# Patient Record
Sex: FEMALE | Race: WHITE | NOT HISPANIC OR LATINO | URBAN - METROPOLITAN AREA
[De-identification: names, ages, dates, MRNs, and addresses within clinical notes are randomized per-mention and may not be internally consistent; named-entity substitution may affect disease eponyms.]

---

## 2020-09-24 ENCOUNTER — HOSPITAL ENCOUNTER (OUTPATIENT)
Facility: CLINIC | Age: 65
Discharge: HOME | End: 2020-09-24
Attending: OBSTETRICS & GYNECOLOGY

## 2020-09-24 VITALS — TEMPERATURE: 98.8 F

## 2020-09-24 PROCEDURE — 90471 IMMUNIZATION ADMIN: CPT | Performed by: EMERGENCY MEDICINE

## 2020-09-24 PROCEDURE — 90686 IIV4 VACC NO PRSV 0.5 ML IM: CPT | Performed by: EMERGENCY MEDICINE

## 2021-04-15 DIAGNOSIS — Z23 ENCOUNTER FOR IMMUNIZATION: ICD-10-CM

## 2021-07-12 ENCOUNTER — HOSPITAL ENCOUNTER (EMERGENCY)
Facility: HOSPITAL | Age: 66
Discharge: LEFT BEFORE DISCHARGE | End: 2021-07-13
Payer: COMMERCIAL

## 2021-07-12 VITALS
RESPIRATION RATE: 16 BRPM | HEIGHT: 66 IN | BODY MASS INDEX: 22.5 KG/M2 | HEART RATE: 77 BPM | DIASTOLIC BLOOD PRESSURE: 82 MMHG | TEMPERATURE: 98 F | WEIGHT: 140 LBS | OXYGEN SATURATION: 96 % | SYSTOLIC BLOOD PRESSURE: 149 MMHG

## 2021-07-12 LAB
ANION GAP SERPL CALC-SCNC: 12 MEQ/L (ref 3–15)
BASOPHILS # BLD: 0.04 K/UL (ref 0.01–0.1)
BASOPHILS NFR BLD: 0.5 %
BUN SERPL-MCNC: 18 MG/DL (ref 8–20)
CALCIUM SERPL-MCNC: 9.5 MG/DL (ref 8.9–10.3)
CHLORIDE SERPL-SCNC: 100 MEQ/L (ref 98–109)
CO2 SERPL-SCNC: 26 MEQ/L (ref 22–32)
CREAT SERPL-MCNC: 0.8 MG/DL (ref 0.6–1.1)
DIFFERENTIAL METHOD BLD: NORMAL
EOSINOPHIL # BLD: 0.21 K/UL (ref 0.04–0.36)
EOSINOPHIL NFR BLD: 2.8 %
ERYTHROCYTE [DISTWIDTH] IN BLOOD BY AUTOMATED COUNT: 12.3 % (ref 11.7–14.4)
GFR SERPL CREATININE-BSD FRML MDRD: >60 ML/MIN/1.73M*2
GLUCOSE SERPL-MCNC: 107 MG/DL (ref 70–99)
HCT VFR BLDCO AUTO: 40.7 % (ref 35–45)
HGB BLD-MCNC: 13.4 G/DL (ref 11.8–15.7)
IMM GRANULOCYTES # BLD AUTO: 0.01 K/UL (ref 0–0.08)
IMM GRANULOCYTES NFR BLD AUTO: 0.1 %
LYMPHOCYTES # BLD: 2.21 K/UL (ref 1.2–3.5)
LYMPHOCYTES NFR BLD: 29.1 %
MCH RBC QN AUTO: 31.4 PG (ref 28–33.2)
MCHC RBC AUTO-ENTMCNC: 32.9 G/DL (ref 32.2–35.5)
MCV RBC AUTO: 95.3 FL (ref 83–98)
MONOCYTES # BLD: 0.5 K/UL (ref 0.28–0.8)
MONOCYTES NFR BLD: 6.6 %
NEUTROPHILS # BLD: 4.63 K/UL (ref 1.7–7)
NEUTS SEG NFR BLD: 60.9 %
NRBC BLD-RTO: 0 %
PDW BLD AUTO: 9.8 FL (ref 9.4–12.3)
PLATELET # BLD AUTO: 236 K/UL (ref 150–369)
POTASSIUM SERPL-SCNC: 4.3 MEQ/L (ref 3.6–5.1)
RBC # BLD AUTO: 4.27 M/UL (ref 3.93–5.22)
SODIUM SERPL-SCNC: 138 MEQ/L (ref 136–144)
WBC # BLD AUTO: 7.6 K/UL (ref 3.8–10.5)

## 2021-07-12 PROCEDURE — 85025 COMPLETE CBC W/AUTO DIFF WBC: CPT

## 2021-07-12 PROCEDURE — 87040 BLOOD CULTURE FOR BACTERIA: CPT

## 2021-07-12 PROCEDURE — 36415 COLL VENOUS BLD VENIPUNCTURE: CPT

## 2021-07-12 PROCEDURE — 80048 BASIC METABOLIC PNL TOTAL CA: CPT

## 2021-07-12 RX ORDER — SULFAMETHOXAZOLE AND TRIMETHOPRIM 200; 40 MG/5ML; MG/5ML
SUSPENSION ORAL 2 TIMES DAILY
COMMUNITY
End: 2023-09-08 | Stop reason: ALTCHOICE

## 2021-07-18 LAB — BACTERIA BLD CULT: NORMAL

## 2023-04-08 ENCOUNTER — APPOINTMENT (OUTPATIENT)
Dept: RADIOLOGY | Age: 68
End: 2023-04-08
Payer: MEDICARE

## 2023-04-08 ENCOUNTER — HOSPITAL ENCOUNTER (OUTPATIENT)
Facility: CLINIC | Age: 68
Discharge: HOME | End: 2023-04-08
Attending: FAMILY MEDICINE
Payer: MEDICARE

## 2023-04-08 VITALS
TEMPERATURE: 97.8 F | OXYGEN SATURATION: 96 % | WEIGHT: 140 LBS | SYSTOLIC BLOOD PRESSURE: 138 MMHG | HEIGHT: 66 IN | DIASTOLIC BLOOD PRESSURE: 78 MMHG | BODY MASS INDEX: 22.5 KG/M2 | HEART RATE: 95 BPM

## 2023-04-08 DIAGNOSIS — M25.552 PAIN OF LEFT HIP: Primary | ICD-10-CM

## 2023-04-08 PROCEDURE — 99213 OFFICE O/P EST LOW 20 MIN: CPT

## 2023-04-08 PROCEDURE — 73502 X-RAY EXAM HIP UNI 2-3 VIEWS: CPT

## 2023-04-08 RX ORDER — CEFDINIR 300 MG/1
300 CAPSULE ORAL 2 TIMES DAILY
Qty: 14 CAPSULE | Refills: 0 | Status: SHIPPED | OUTPATIENT
Start: 2023-04-08 | End: 2023-04-15

## 2023-04-08 ASSESSMENT — ENCOUNTER SYMPTOMS
DYSURIA: 0
SINUS PAIN: 0
SINUS PRESSURE: 0
SORE THROAT: 0
BACK PAIN: 0
HEADACHES: 0
TROUBLE SWALLOWING: 0
NUMBNESS: 0
DIZZINESS: 1
FATIGUE: 0
FREQUENCY: 0
LIGHT-HEADEDNESS: 0
COLOR CHANGE: 0
NAUSEA: 0
MYALGIAS: 1
FEVER: 0
WHEEZING: 0
VOMITING: 0
WEAKNESS: 0
SHORTNESS OF BREATH: 0
HEMATURIA: 0
EYES NEGATIVE: 1
PALPITATIONS: 0
ABDOMINAL PAIN: 0
NECK STIFFNESS: 0
CHILLS: 0
FLANK PAIN: 0
COUGH: 0
ACTIVITY CHANGE: 1
RHINORRHEA: 0
NECK PAIN: 0
DIARRHEA: 0
CHEST TIGHTNESS: 0
ARTHRALGIAS: 1

## 2023-04-08 NOTE — ED PROVIDER NOTES
Emergency Medicine Note  HPI   HISTORY OF PRESENT ILLNESS     Pt reporting intense constant pain in left hip, tender to palpation, having trouble walking and using stairs which started around Wednesday.  Pain woke her up out of her sleep, she denies any trauma/injury to the area.   She has been taking Advil at night to get sleep since it has been keeping her up at night.   Had similar pain in right hip 6 years ago with associated fever that developed 5-6 days later and put on Cefdinir which made her feel better by the 2nd dose.   Pt requesting Cefdinir rx for this visit.  Pt denies any fever/chills, SOB, CP, ear pain, sinus congestion, abdominal pain, lightheadedness, n/v/d, cough, back pain, flank pain, dysuria.                  Patient History   PAST HISTORY     Reviewed from Nursing Triage:  Tobacco  Allergies  Meds  Problems  Med Hx  Surg Hx  Fam Hx  Soc   Hx      History reviewed. No pertinent past medical history.    History reviewed. No pertinent surgical history.    Family History   Problem Relation Age of Onset   • No Known Problems Biological Mother    • No Known Problems Biological Father        Social History     Tobacco Use   • Smoking status: Never   • Smokeless tobacco: Never   Substance Use Topics   • Alcohol use: Yes   • Drug use: Never         Review of Systems   REVIEW OF SYSTEMS     Review of Systems   Constitutional: Positive for activity change. Negative for chills, fatigue and fever.   HENT: Negative for congestion, ear pain, postnasal drip, rhinorrhea, sinus pressure, sinus pain, sore throat and trouble swallowing.    Eyes: Negative.    Respiratory: Negative for cough, chest tightness, shortness of breath and wheezing.    Cardiovascular: Negative for chest pain and palpitations.   Gastrointestinal: Negative for abdominal pain, diarrhea, nausea and vomiting.   Genitourinary: Negative for decreased urine volume, dysuria, flank pain, frequency, hematuria, pelvic pain and urgency.    Musculoskeletal: Positive for arthralgias, gait problem and myalgias. Negative for back pain, neck pain and neck stiffness.   Skin: Negative for color change and rash.   Neurological: Positive for dizziness. Negative for weakness, light-headedness, numbness and headaches.   All other systems reviewed and are negative.        VITALS     ED Vitals    Date/Time Temp Pulse Resp BP SpO2 [facility]   04/08/23 1229 36.6 °C (97.8 °F) 95 -- 138/78 96 % JQ                       Physical Exam   PHYSICAL EXAM     Physical Exam  Vitals and nursing note reviewed.   Constitutional:       General: She is not in acute distress.     Appearance: She is well-developed. She is not ill-appearing, toxic-appearing or diaphoretic.   HENT:      Head: Normocephalic and atraumatic.      Mouth/Throat:      Mouth: Mucous membranes are moist.   Eyes:      Extraocular Movements: Extraocular movements intact.      Conjunctiva/sclera: Conjunctivae normal.      Pupils: Pupils are equal, round, and reactive to light.   Cardiovascular:      Rate and Rhythm: Normal rate.      Pulses: Normal pulses.   Pulmonary:      Effort: Pulmonary effort is normal. No respiratory distress.   Abdominal:      Tenderness: There is no right CVA tenderness or left CVA tenderness.   Musculoskeletal:         General: Tenderness present. No swelling, deformity or signs of injury.      Cervical back: Normal range of motion.      Right lower leg: Normal. No edema.      Left lower leg: Tenderness and bony tenderness present. No edema.        Legs:       Comments: +ttp, limited ROM and with pain, 4/5 strength, unsteady gait, no surface trauma or ecchymosis, swelling or erythema.     Skin:     General: Skin is warm and dry.      Capillary Refill: Capillary refill takes less than 2 seconds.      Findings: No bruising or erythema.   Neurological:      Mental Status: She is alert and oriented to person, place, and time.           PROCEDURES     Procedures     DATA     Results     None               No orders to display       Scoring tools                                  ED Course & MDM   MDM / ED COURSE / CLINICAL IMPRESSION / DISPO     Medical Decision Making  Xray hip normal  Discussed options of steroid vs Cefdinir requested by pt vs OTC meds  Pt requesting to be given rx for Cefdinir- given exact same symptoms as last time, rx sent to pharmacy   Recommending RICE & NSAIDs  Strict return precautions given.  Pt understands that if symptoms don't resolve within 24 hours of starting abx, alternative treatment may be necessary.  Recommending Orthopedic specialist if symptoms persist/worsen.   Follow up with PCP as needed    Pt given the opportunity to ask questions, we discussed SE profile of meds and pt agreeable to plan.           Clinical Impression      Pain of left hip               Gavi Shaw, LIN  04/08/23 1365

## 2023-04-08 NOTE — DISCHARGE INSTRUCTIONS
Your xray was normal.  CLINICAL HISTORY: Left hip pain, constant.  M 25.552     COMMENT: AP radiograph of the pelvis and AP and lateral views of the left hip  are obtained.     COMPARISON: None.     There is normal osseous mineralization.  No evidence for focal bone lesion.  No fracture or dislocation.  Alternative changes are seen at both hips with osteophytes at the femoral head  neck junctions bilaterally. Degenerative changes at the pubic symphysis.     --  IMPRESSION:  No fracture or dislocation of the left hip.    Due to your symptoms and prior history, you have requested a prescription for Cefdinir which you received when you had this exact same pain in your right hip 6 years ago.  If you do not feel any improvement within 24 hours of starting the antibiotic, give the office a call as you may need alternative therapy.    Ice the injured area to help reduce pain and swelling. Wrap a cold source (recommending a bag of frozen peas or frozen corn) in a thin towel. Apply to the injured area for 20 minutes every 1 to 2 hours the first day. Continue this 3 to 4 times a day until the pain and swelling goes away.     Elevate injured body part(s) above the level of the heart.     Remember,  Ice is Nice, that's why they rhyme, and you can use it ALL the time : )     __________________________________________________________    **Avoid heat if possible. If anything, apply moist heat (ie- shower, warm wet washcloth compresses), this is preferred over dry heat (ie-heating pad).** Moist heat is recommended prior to activity/strengthening/stretching. Ice immediately after .  __________________________________________________________    For pain:    You may alternate Ibuprofen with Tylenol every 3-4 hours. (Ex: Ibuprofen at 8am, Tylenol at 11am, Ibuprofen at 2pm, etc) as needed.   Ibuprofen 600mg every 6hr, Always with food   (Max daily dose not to exceed 2400mg!) Tylenol 500mg doses every 4hrs OR 650mg doses every 6hrs (Max  daily dose not to exceed 3000mg!)    **Do NOT take Ibuprofen if you are also using Voltaren**  __________________________________________________________     If symptoms persist or worsen, consider following up with Primary Care Provider and/or an Orthopedic specialist.

## 2023-04-09 ENCOUNTER — TELEPHONE (OUTPATIENT)
Dept: URGENT CARE | Facility: CLINIC | Age: 68
End: 2023-04-09
Payer: MEDICARE

## 2023-04-09 NOTE — ED ATTESTATION NOTE
I was immediately available to provide supervision and direction for the care of the patient.    The patient was evaluated and managed by the nurse practitioner.       Og Carter DO  04/09/23 1202

## 2023-09-08 ENCOUNTER — HOSPITAL ENCOUNTER (OUTPATIENT)
Facility: CLINIC | Age: 68
Discharge: HOME | End: 2023-09-08
Attending: FAMILY MEDICINE
Payer: MEDICARE

## 2023-09-08 VITALS
TEMPERATURE: 98.4 F | RESPIRATION RATE: 16 BRPM | SYSTOLIC BLOOD PRESSURE: 123 MMHG | HEART RATE: 75 BPM | DIASTOLIC BLOOD PRESSURE: 75 MMHG | OXYGEN SATURATION: 97 %

## 2023-09-08 DIAGNOSIS — J06.9 VIRAL UPPER RESPIRATORY TRACT INFECTION: Primary | ICD-10-CM

## 2023-09-08 LAB
FLUAV RNA SPEC QL NAA+PROBE: NEGATIVE
FLUBV RNA SPEC QL NAA+PROBE: NEGATIVE
RSV RNA SPEC QL NAA+PROBE: NEGATIVE
SARS-COV-2 RNA RESP QL NAA+PROBE: NEGATIVE

## 2023-09-08 PROCEDURE — 87637 SARSCOV2&INF A&B&RSV AMP PRB: CPT | Performed by: FAMILY MEDICINE

## 2023-09-08 PROCEDURE — 99213 OFFICE O/P EST LOW 20 MIN: CPT | Performed by: FAMILY MEDICINE

## 2023-09-08 ASSESSMENT — ENCOUNTER SYMPTOMS
WHEEZING: 0
HEADACHES: 1
CHILLS: 0
SHORTNESS OF BREATH: 0
FEVER: 0
SORE THROAT: 0
COUGH: 1

## 2023-09-08 NOTE — ED PROVIDER NOTES
History  Chief Complaint   Patient presents with    URI     Headache congestion earache at home covid negative  patient was with 3 people earlier in the week that were positive     Patient recently returned from the beach and the other guests tested positive for Covid.  Her symptoms started yesterday.  She has mild cold symptoms.  She has mild cough and congestion.  She has no fever or chills.  She has no sore throat.  He home covid test was negative this morning.      URI  Presenting symptoms: congestion and cough    Presenting symptoms: no fever and no sore throat    Severity:  Mild  Duration:  1 day  Progression:  Unchanged  Chronicity:  New  Worsened by:  Nothing  Ineffective treatments:  None tried  Associated symptoms: headaches    Associated symptoms: no wheezing    Risk factors: recent travel and sick contacts        History reviewed. No pertinent past medical history.    History reviewed. No pertinent surgical history.    Family History   Problem Relation Age of Onset    No Known Problems Biological Mother     No Known Problems Biological Father        Social History     Tobacco Use    Smoking status: Never    Smokeless tobacco: Never   Substance Use Topics    Alcohol use: Yes    Drug use: Never       Review of Systems   Constitutional: Negative for chills and fever.   HENT: Positive for congestion. Negative for sore throat.    Respiratory: Positive for cough. Negative for shortness of breath and wheezing.    Neurological: Positive for headaches.       Physical Exam  ED Triage Vitals [09/08/23 1316]   Temp Heart Rate Resp BP SpO2   36.9 °C (98.4 °F) 75 16 123/75 97 %      Temp src Heart Rate Source Patient Position BP Location FiO2 (%) (Set)   -- Monitor Sitting Left upper arm --       Physical Exam  Vitals reviewed.   Constitutional:       Appearance: Normal appearance.   HENT:      Right Ear: Tympanic membrane, ear canal and external ear normal.      Left Ear: Tympanic membrane, ear canal and  external ear normal.      Mouth/Throat:      Mouth: Mucous membranes are moist.      Pharynx: Oropharynx is clear.   Cardiovascular:      Rate and Rhythm: Normal rate and regular rhythm.      Heart sounds: Normal heart sounds.   Pulmonary:      Effort: Pulmonary effort is normal. No respiratory distress.      Breath sounds: Normal breath sounds. No wheezing or rhonchi.   Musculoskeletal:      Cervical back: Neck supple.   Lymphadenopathy:      Cervical: No cervical adenopathy.   Neurological:      Mental Status: She is alert.           Procedures  Procedures    UC Course   Viral URI    MDM  COVID swab done  Symptomatic care  Quarantine until results return  Rest and fluids             Gaby Nails,   09/08/23 1428

## 2024-01-02 ENCOUNTER — HOSPITAL ENCOUNTER (OUTPATIENT)
Facility: CLINIC | Age: 69
Discharge: HOME | End: 2024-01-02
Attending: EMERGENCY MEDICINE
Payer: MEDICARE

## 2024-01-02 VITALS
SYSTOLIC BLOOD PRESSURE: 145 MMHG | HEART RATE: 88 BPM | DIASTOLIC BLOOD PRESSURE: 66 MMHG | OXYGEN SATURATION: 97 % | TEMPERATURE: 98.5 F

## 2024-01-02 DIAGNOSIS — N30.01 ACUTE CYSTITIS WITH HEMATURIA: Primary | ICD-10-CM

## 2024-01-02 LAB
BACTERIA, POC: ABNORMAL
BILIRUBIN, POC: NEGATIVE
BLOOD URINE, POC: POSITIVE
CLARITY, POC: CLEAR
COLOR, POC: COLORLESS
EXPIRATION DATE: ABNORMAL
GLUCOSE URINE, POC: NEGATIVE
KETONES, POC: NEGATIVE
LEUKOCYTE EST, POC: ABNORMAL
Lab: ABNORMAL
NITRITE, POC: NEGATIVE
PH, POC: 6
POCT MANUFACTURER: ABNORMAL
PROTEIN, POC: ABNORMAL
SPECIFIC GRAVITY, POC: 1000
UROBILINOGEN, POC: 0.2

## 2024-01-02 PROCEDURE — 99213 OFFICE O/P EST LOW 20 MIN: CPT

## 2024-01-02 PROCEDURE — 87186 SC STD MICRODIL/AGAR DIL: CPT

## 2024-01-02 PROCEDURE — 81002 URINALYSIS NONAUTO W/O SCOPE: CPT | Performed by: EMERGENCY MEDICINE

## 2024-01-02 RX ORDER — CEFUROXIME AXETIL 250 MG/1
250 TABLET ORAL 2 TIMES DAILY
Qty: 14 TABLET | Refills: 0 | Status: SHIPPED | OUTPATIENT
Start: 2024-01-02 | End: 2024-01-09

## 2024-01-02 ASSESSMENT — ENCOUNTER SYMPTOMS
NECK PAIN: 0
NUMBNESS: 0
CHEST TIGHTNESS: 0
SHORTNESS OF BREATH: 0
ABDOMINAL PAIN: 0
FATIGUE: 0
SINUS PRESSURE: 0
NECK STIFFNESS: 0
EYES NEGATIVE: 1
COLOR CHANGE: 0
FEVER: 0
SINUS PAIN: 0
PALPITATIONS: 0
DIZZINESS: 0
MYALGIAS: 0
HEADACHES: 0
FREQUENCY: 1
DIARRHEA: 0
COUGH: 0
LIGHT-HEADEDNESS: 0
RHINORRHEA: 0
TROUBLE SWALLOWING: 0
DYSURIA: 1
VOMITING: 0
NAUSEA: 0
WHEEZING: 0
BACK PAIN: 0
SORE THROAT: 0
CHILLS: 0
WEAKNESS: 0
HEMATURIA: 1
FLANK PAIN: 0

## 2024-01-02 NOTE — DISCHARGE INSTRUCTIONS
You are being treated for urinary tract infection (UTI) with an antibiotic.  Once started, please complete full course of antibiotic to prevent creating resistance within the bugs causing your illness.  Please eat a daily yogurt or take a daily Probiotic for GI health while taking antibiotics.  Recommending the probiotic Digestive Advantage (safe for patients 3yrs and older).  Remember to take the probiotic *no sooner* than 4 hours *after* taking your antibiotic.  If you take the probiotic sooner than that, the good bacteria will likely be killed by the antibiotic.     Symptomatic relief with prescription Pyridium tablets and/or OTC Azo were discussed.  **It will turn your urine ORANGE** Fear not! This is expected ;)    I recommend drinking plenty of fluids to help flush out the infection.  You may take Tylenol/Ibuprofen as needed for pain.    *If you develop new onset low back pain, abdominal pain, fevers or chills over the next several days, please call your primary care provider to discuss seeking reevaluation or seek immediate medical attention.    If the urine sample was sent for additional testing (culture and sensitivity to antibiotics), you will receive a call only if your antibiotic needs to be changed to a different one.  If you are signed up for My Chart, results may also be viewed in that patient portal.  __________________________________________________________    Please let us know about your experience today!   Your input is truly appreciated and helps KENNEDY Wynn and your team at [facility] to continue to provide a superior level of service!   Get Well Soon!

## 2024-01-02 NOTE — ED ATTESTATION NOTE
I was immediately available to provide supervision and direction for the care of the patient.     Sujatha Fermin,   01/02/24 1521

## 2024-01-02 NOTE — ED PROVIDER NOTES
Emergency Medicine Note  HPI   HISTORY OF PRESENT ILLNESS     Pt reporting frequency, hematuria and dysuria that started yesterday afternoon.  No abdominal/back/pelvic/flank pain. No N/V. No fever.   She hasn't taken anything otc for symptoms.             Patient History   PAST HISTORY     Reviewed from Nursing Triage:  Tobacco  Allergies  Meds  Problems  Med Hx  Surg Hx  Fam Hx  Soc   Hx      History reviewed. No pertinent past medical history.    History reviewed. No pertinent surgical history.    Family History   Problem Relation Age of Onset   • No Known Problems Biological Mother    • No Known Problems Biological Father        Social History     Tobacco Use   • Smoking status: Never   • Smokeless tobacco: Never   Substance Use Topics   • Alcohol use: Yes   • Drug use: Never         Review of Systems   REVIEW OF SYSTEMS     Review of Systems   Constitutional: Negative for chills, fatigue and fever.   HENT: Negative for congestion, ear pain, postnasal drip, rhinorrhea, sinus pressure, sinus pain, sore throat and trouble swallowing.    Eyes: Negative.    Respiratory: Negative for cough, chest tightness, shortness of breath and wheezing.    Cardiovascular: Negative for chest pain and palpitations.   Gastrointestinal: Negative for abdominal pain, diarrhea, nausea and vomiting.   Genitourinary: Positive for dysuria, frequency, hematuria and urgency. Negative for decreased urine volume, flank pain, pelvic pain, vaginal bleeding, vaginal discharge and vaginal pain.   Musculoskeletal: Negative for back pain, gait problem, myalgias, neck pain and neck stiffness.   Skin: Negative for color change and rash.   Neurological: Negative for dizziness, weakness, light-headedness, numbness and headaches.   All other systems reviewed and are negative.        VITALS     ED Vitals    Date/Time Temp Pulse Resp BP SpO2 [facility]   01/02/24 1349 36.9 °C (98.5 °F) 88 -- 145/66 97 %                        Physical Exam   PHYSICAL  EXAM     Physical Exam  Vitals and nursing note reviewed.   Constitutional:       General: She is not in acute distress.     Appearance: Normal appearance. She is well-developed. She is not ill-appearing, toxic-appearing or diaphoretic.   HENT:      Head: Normocephalic and atraumatic.      Nose: No congestion or rhinorrhea.      Mouth/Throat:      Mouth: Mucous membranes are moist.   Eyes:      Extraocular Movements: Extraocular movements intact.      Conjunctiva/sclera: Conjunctivae normal.      Pupils: Pupils are equal, round, and reactive to light.   Cardiovascular:      Rate and Rhythm: Normal rate and regular rhythm.   Pulmonary:      Effort: Pulmonary effort is normal. No respiratory distress.   Abdominal:      General: Bowel sounds are normal.      Palpations: Abdomen is soft.      Tenderness: There is no abdominal tenderness. There is no right CVA tenderness, left CVA tenderness or guarding.   Musculoskeletal:         General: No tenderness. Normal range of motion.      Cervical back: Normal range of motion.      Right lower leg: No edema.      Left lower leg: No edema.   Skin:     General: Skin is warm and dry.      Capillary Refill: Capillary refill takes less than 2 seconds.   Neurological:      Mental Status: She is alert and oriented to person, place, and time.           PROCEDURES     Procedures     DATA     Results     None              No orders to display       Scoring tools                                  ED Course & MDM   MDM / ED COURSE / CLINICAL IMPRESSION / DISPO     Medical Decision Making  Urinalysis showed +RBC/ +1 leuks       Urine C&S sent.   Ceftin Rx'ed bid x 7 days- discussed SE profile, take yogurt/probiotic  Patient appears well in NAD. VS WNL. Abdomen is soft w/o tenderness or guarding. No CVA tenderness.  Exam consistent with UTI uncomplicated without evidence of pyelonephritis  Pt without flank pain, fever, chills, or abnormal vaginal discharge or bleeding  Rec otc meds for  symptom relief.  Recommended follow-up with PCP or return to urgent care if symptoms worsen or do not improve in 24-48 hours.   Advised patient to report to ED if develops fevers, nausea, vomiting and inability to tolerate food, fluids and meds  Patient given opportunity to ask questions and all were answered.   Patient verbalized understanding and agreeable with plan.            Clinical Impression      Acute cystitis with hematuria     _________________     ED Disposition   Discharge                   Gavi Shaw FNP  01/02/24 2999

## 2024-01-04 LAB
BACTERIA UR CULT: ABNORMAL
BACTERIA UR CULT: ABNORMAL